# Patient Record
Sex: FEMALE | Race: WHITE | NOT HISPANIC OR LATINO | URBAN - NONMETROPOLITAN AREA
[De-identification: names, ages, dates, MRNs, and addresses within clinical notes are randomized per-mention and may not be internally consistent; named-entity substitution may affect disease eponyms.]

---

## 2020-11-10 ENCOUNTER — OFFICE VISIT (OUTPATIENT)
Dept: URBAN - NONMETROPOLITAN AREA CLINIC 2 | Facility: CLINIC | Age: 75
End: 2020-11-10

## 2021-08-18 ENCOUNTER — LAB OUTSIDE AN ENCOUNTER (OUTPATIENT)
Dept: URBAN - NONMETROPOLITAN AREA CLINIC 2 | Facility: CLINIC | Age: 76
End: 2021-08-18

## 2021-08-18 ENCOUNTER — WEB ENCOUNTER (OUTPATIENT)
Dept: URBAN - NONMETROPOLITAN AREA CLINIC 2 | Facility: CLINIC | Age: 76
End: 2021-08-18

## 2021-08-18 ENCOUNTER — OFFICE VISIT (OUTPATIENT)
Dept: URBAN - NONMETROPOLITAN AREA CLINIC 2 | Facility: CLINIC | Age: 76
End: 2021-08-18
Payer: MEDICARE

## 2021-08-18 VITALS
SYSTOLIC BLOOD PRESSURE: 111 MMHG | TEMPERATURE: 97.5 F | HEART RATE: 83 BPM | HEIGHT: 62 IN | WEIGHT: 102 LBS | BODY MASS INDEX: 18.77 KG/M2 | DIASTOLIC BLOOD PRESSURE: 67 MMHG

## 2021-08-18 DIAGNOSIS — K62.5 RECTAL BLEEDING: ICD-10-CM

## 2021-08-18 DIAGNOSIS — D64.89 ANEMIA DUE TO OTHER CAUSE: ICD-10-CM

## 2021-08-18 DIAGNOSIS — Z12.11 SCREENING FOR COLON CANCER: ICD-10-CM

## 2021-08-18 DIAGNOSIS — K63.89 SMALL INTESTINAL BACTERIAL OVERGROWTH: ICD-10-CM

## 2021-08-18 DIAGNOSIS — R93.5 ABNORMAL MRI OF ABDOMEN: ICD-10-CM

## 2021-08-18 DIAGNOSIS — K58.9 IBS (IRRITABLE BOWEL SYNDROME): ICD-10-CM

## 2021-08-18 DIAGNOSIS — K27.9 PEPTIC ULCER: ICD-10-CM

## 2021-08-18 PROCEDURE — 99214 OFFICE O/P EST MOD 30 MIN: CPT | Performed by: NURSE PRACTITIONER

## 2021-08-18 RX ORDER — FAMOTIDINE 20 MG/1
TAKE 1 TABLET BY MOUTH TWICE A DAY TABLET ORAL TWICE A DAY
Qty: 180 TABLET | Refills: 3 | OUTPATIENT
Start: 2021-08-18

## 2021-08-18 RX ORDER — LIOTHYRONINE SODIUM 5 UG/1
6 DAYS A WEEK TABLET ORAL
Qty: 0 | Refills: 0 | Status: ACTIVE | COMMUNITY
Start: 1900-01-01

## 2021-08-18 RX ORDER — SODIUM PICOSULFATE, MAGNESIUM OXIDE, AND ANHYDROUS CITRIC ACID 10; 3.5; 12 MG/160ML; G/160ML; G/160ML
160 ML LIQUID ORAL
Qty: 320 MILLILITER | Refills: 0 | OUTPATIENT
Start: 2021-08-18 | End: 2021-08-19

## 2021-08-18 NOTE — HPI-TODAY'S VISIT:
12/10/2018 Ms Jarrett Is a 73-year-old female with a past medical history of SIBO he returns for follow-up of GI bleed. She reports she was in [place] in the fall and developed some epigastric discomfort. Previously, she had been treated for with Xifaxan for SIBO and believed it had returned. [place] the [place], she took supplements for IBS that did not resolve her symptoms. She also was prescribed Alinia by physician in [place] that also did not resolve her symptoms. She went to visit her daughter in [place] and continued to have epigastric pain. She began to have black stool and her daughter noticed she had lost roughly 10 pounds. She then presented to emergency department in [place] with a hemoglobin of 6.1. She then had an EGD and colonoscopy while inpatient there. Normal colonoscopy. EGD with a 2.5 cm duodenal ulcer. Pathology negative for H. pylori. No NSAIDs or Goody powder use. She denies any EtOH. She denies any further bleeding, weight loss, or additional GI symptoms. Otherwise, she is healthy.  11/8/19 Ms. Jarrett presents today for f/u of her US and CT and MRI. She was having RUQ pain at our last visit, this has now resolved. She had an US with an abnormal lymph node. Her CT was initially read as normal, but then when compared with US, an inflammed lesion was again identifed. ON MRI, it appears to be a lymph node, but f/u in 6 months was recommeded. Ms. Jarrett is feeling better today and she is without GI complaints. She wants to try to wean down on her PPI. We repeated her EGD because she had a large DU at an outside facility during a GI bleed. Her EGD did not reveal an ulcer. She weaned her PPI down to 20mg in the PM. She started having RUQ abdominal pain and she was concerned about her ulcer. Her recent HGB had improved.  5/8/20 Ms. Connolly presents today for f/u of her MRI. She has been feeling well. She denies any reflux or RUQ pain. She wants to try to wean down on pepcid. Her MRI did show a stable lesion near her adrenal. She is interested in seeing a urologist. Overall, she is without GI complaints and feeling well.  8/18/2021 Barbie presents for evaluation of dyspepsia, reported dark stools with questionable melena, anemia, and Hemoccult positive stool.  She states in mid July she ate a piece of gluten-free bread and developed immediate dyspepsia, gas, bloating and dark stools.  She happened to have an appointment with her family physician that week and had lab work done with mild anemia.  She requested Hemoccult cards, these did come back trace positive.  She did note dark stools for 3 days after this episode.  She did start taking Pepcid with some provement in her dyspepsia.  She denies any NSAID use since her GI bleed in 2018 in [place] with a history of a DU.  She saw Carissa and Ihsan at the time, she did have Crohn's genetics drawn at that time which were positive.  She did have a colonoscopy at the time as well in [place] that was normal per her report.  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  She was referred to Dr. Fry who did perform a work-up, her last MRI in April 2021 shows stable lesion.  She denies any specific GI complaints prior to eating the piece of gluten-free bread.  She denies any weight loss.  Her last EGD was done in 2019 with no peptic ulcer disease noted, her biopsies did show gastritis and esophagitis.  Her last colonoscopy done by our practice was in 2013 by Dr. Herrera with normal terminal ileum and normal random biopsies.  She also had one in [place] in 2018.  These were results are not available in the chart.  Today she is concerned about her anemia and her recent episode of dark stools and dyspepsia.  She would like to proceed with a EGD and colonoscopy for further evaluation.  MB

## 2021-08-19 ENCOUNTER — TELEPHONE ENCOUNTER (OUTPATIENT)
Dept: URBAN - METROPOLITAN AREA CLINIC 92 | Facility: CLINIC | Age: 76
End: 2021-08-19

## 2021-08-19 LAB
A/G RATIO: 1.7
ALBUMIN: 4.5
ALKALINE PHOSPHATASE: 128
ALT (SGPT): 18
AST (SGOT): 26
BASO (ABSOLUTE): 0
BASOS: 1
BILIRUBIN, TOTAL: 0.2
BUN/CREATININE RATIO: 20
BUN: 14
CALCIUM: 9.9
CARBON DIOXIDE, TOTAL: 27
CHLORIDE: 101
CREATININE: 0.7
EGFR IF AFRICN AM: 99
EGFR IF NONAFRICN AM: 86
EOS (ABSOLUTE): 0.1
EOS: 2
FERRITIN, SERUM: 50
GLOBULIN, TOTAL: 2.7
GLUCOSE: 90
HEMATOCRIT: 36.9
HEMATOLOGY COMMENTS:: (no result)
HEMOGLOBIN: 12
IMMATURE CELLS: (no result)
IMMATURE GRANS (ABS): 0
IMMATURE GRANULOCYTES: 0
LYMPHS (ABSOLUTE): 0.8
LYMPHS: 20
MCH: 30.6
MCHC: 32.5
MCV: 94
MONOCYTES(ABSOLUTE): 0.4
MONOCYTES: 10
NEUTROPHILS (ABSOLUTE): 2.6
NEUTROPHILS: 67
NRBC: (no result)
PLATELETS: 338
POTASSIUM: 4.7
PROTEIN, TOTAL: 7.2
RBC: 3.92
RDW: 12.8
SODIUM: 143
WBC: 3.9

## 2021-09-07 ENCOUNTER — TELEPHONE ENCOUNTER (OUTPATIENT)
Dept: URBAN - NONMETROPOLITAN AREA CLINIC 2 | Facility: CLINIC | Age: 76
End: 2021-09-07

## 2021-09-07 RX ORDER — LIOTHYRONINE SODIUM 5 UG/1
6 DAYS A WEEK TABLET ORAL
Qty: 0 | Refills: 0 | Status: ACTIVE | COMMUNITY
Start: 1900-01-01

## 2021-09-07 RX ORDER — SODIUM PICOSULFATE, MAGNESIUM OXIDE, AND ANHYDROUS CITRIC ACID 10; 3.5; 12 MG/160ML; G/160ML; G/160ML
160 ML LIQUID ORAL
Qty: 320 MILLILITER | Refills: 0 | OUTPATIENT
Start: 2021-09-07 | End: 2021-09-08

## 2021-09-07 RX ORDER — FAMOTIDINE 20 MG/1
TAKE 1 TABLET BY MOUTH TWICE A DAY TABLET ORAL TWICE A DAY
Qty: 180 TABLET | Refills: 3 | Status: ACTIVE | COMMUNITY
Start: 2021-08-18

## 2021-09-13 ENCOUNTER — CLAIMS CREATED FROM THE CLAIM WINDOW (OUTPATIENT)
Dept: URBAN - METROPOLITAN AREA CLINIC 4 | Facility: CLINIC | Age: 76
End: 2021-09-13
Payer: MEDICARE

## 2021-09-13 ENCOUNTER — OFFICE VISIT (OUTPATIENT)
Dept: URBAN - NONMETROPOLITAN AREA SURGERY CENTER 1 | Facility: SURGERY CENTER | Age: 76
End: 2021-09-13
Payer: MEDICARE

## 2021-09-13 DIAGNOSIS — K31.89 NONSURGICAL DUMPING SYNDROME: ICD-10-CM

## 2021-09-13 DIAGNOSIS — K21.9 ACID REFLUX: ICD-10-CM

## 2021-09-13 DIAGNOSIS — K50.00 CROHN'S DISEASE OF SMALL INTESTINE WITHOUT COMPLICATIONS: ICD-10-CM

## 2021-09-13 DIAGNOSIS — D50.9 ANEMIA: ICD-10-CM

## 2021-09-13 DIAGNOSIS — K21.9 GASTRO-ESOPHAGEAL REFLUX DISEASE WITHOUT ESOPHAGITIS: ICD-10-CM

## 2021-09-13 DIAGNOSIS — K50.80 CROHN'S COLITIS: ICD-10-CM

## 2021-09-13 PROCEDURE — G8907 PT DOC NO EVENTS ON DISCHARG: HCPCS | Performed by: INTERNAL MEDICINE

## 2021-09-13 PROCEDURE — 88305 TISSUE EXAM BY PATHOLOGIST: CPT | Performed by: PATHOLOGY

## 2021-09-13 PROCEDURE — 45380 COLONOSCOPY AND BIOPSY: CPT | Performed by: INTERNAL MEDICINE

## 2021-09-13 PROCEDURE — 88312 SPECIAL STAINS GROUP 1: CPT | Performed by: PATHOLOGY

## 2021-09-13 PROCEDURE — 43239 EGD BIOPSY SINGLE/MULTIPLE: CPT | Performed by: INTERNAL MEDICINE

## 2021-09-28 ENCOUNTER — WEB ENCOUNTER (OUTPATIENT)
Dept: URBAN - NONMETROPOLITAN AREA CLINIC 2 | Facility: CLINIC | Age: 76
End: 2021-09-28

## 2021-10-04 ENCOUNTER — WEB ENCOUNTER (OUTPATIENT)
Dept: URBAN - NONMETROPOLITAN AREA CLINIC 2 | Facility: CLINIC | Age: 76
End: 2021-10-04

## 2021-10-08 ENCOUNTER — WEB ENCOUNTER (OUTPATIENT)
Dept: URBAN - NONMETROPOLITAN AREA CLINIC 2 | Facility: CLINIC | Age: 76
End: 2021-10-08

## 2021-10-10 ENCOUNTER — WEB ENCOUNTER (OUTPATIENT)
Dept: URBAN - NONMETROPOLITAN AREA CLINIC 2 | Facility: CLINIC | Age: 76
End: 2021-10-10

## 2021-10-11 ENCOUNTER — WEB ENCOUNTER (OUTPATIENT)
Dept: URBAN - NONMETROPOLITAN AREA CLINIC 2 | Facility: CLINIC | Age: 76
End: 2021-10-11

## 2021-10-13 ENCOUNTER — LAB OUTSIDE AN ENCOUNTER (OUTPATIENT)
Dept: URBAN - NONMETROPOLITAN AREA CLINIC 2 | Facility: CLINIC | Age: 76
End: 2021-10-13

## 2021-10-13 ENCOUNTER — OFFICE VISIT (OUTPATIENT)
Dept: URBAN - NONMETROPOLITAN AREA CLINIC 2 | Facility: CLINIC | Age: 76
End: 2021-10-13
Payer: MEDICARE

## 2021-10-13 ENCOUNTER — WEB ENCOUNTER (OUTPATIENT)
Dept: URBAN - NONMETROPOLITAN AREA CLINIC 2 | Facility: CLINIC | Age: 76
End: 2021-10-13

## 2021-10-13 VITALS
HEIGHT: 62 IN | SYSTOLIC BLOOD PRESSURE: 116 MMHG | TEMPERATURE: 97.3 F | DIASTOLIC BLOOD PRESSURE: 74 MMHG | HEART RATE: 84 BPM | BODY MASS INDEX: 18.99 KG/M2 | WEIGHT: 103.2 LBS

## 2021-10-13 DIAGNOSIS — T18.9XXA FOREIGN BODY IN DIGESTIVE TRACT, INITIAL ENCOUNTER: ICD-10-CM

## 2021-10-13 DIAGNOSIS — K27.9 PEPTIC ULCER: ICD-10-CM

## 2021-10-13 DIAGNOSIS — Z12.11 SCREENING FOR COLON CANCER: ICD-10-CM

## 2021-10-13 DIAGNOSIS — K62.5 RECTAL BLEEDING: ICD-10-CM

## 2021-10-13 DIAGNOSIS — R93.5 ABNORMAL MRI OF ABDOMEN: ICD-10-CM

## 2021-10-13 DIAGNOSIS — K52.9 ILEITIS: ICD-10-CM

## 2021-10-13 DIAGNOSIS — D64.9 ANEMIA: ICD-10-CM

## 2021-10-13 DIAGNOSIS — K63.89 SMALL INTESTINAL BACTERIAL OVERGROWTH: ICD-10-CM

## 2021-10-13 DIAGNOSIS — K58.9 IBS (IRRITABLE BOWEL SYNDROME): ICD-10-CM

## 2021-10-13 PROCEDURE — 99214 OFFICE O/P EST MOD 30 MIN: CPT | Performed by: NURSE PRACTITIONER

## 2021-10-13 RX ORDER — LIOTHYRONINE SODIUM 5 UG/1
6 DAYS A WEEK TABLET ORAL
Qty: 0 | Refills: 0 | COMMUNITY
Start: 1900-01-01

## 2021-10-13 RX ORDER — FAMOTIDINE 20 MG/1
TAKE 1 TABLET BY MOUTH TWICE A DAY TABLET ORAL TWICE A DAY
Qty: 180 TABLET | Refills: 3 | COMMUNITY
Start: 2021-08-18

## 2021-10-13 NOTE — HPI-TODAY'S VISIT:
12/10/2018 Ms Jarrett Is a 73-year-old female with a past medical history of SIBO he returns for follow-up of GI bleed. She reports she was in [place] in the fall and developed some epigastric discomfort. Previously, she had been treated for with Xifaxan for SIBO and believed it had returned. [place], she [place] supplements for IBS that did not resolve her symptoms. She also was prescribed Alinia by physician in [place] that also did not resolve her symptoms. She went to visit her daughter in [place] and continued to have epigastric pain. She began to have black stool and her daughter noticed she had lost roughly 10 pounds. She then presented to emergency department in [place] with a hemoglobin of 6.1. She then had an EGD and colonoscopy while inpatient there. Normal colonoscopy. EGD with a 2.5 cm duodenal ulcer. Pathology negative for H. pylori. No NSAIDs or Goody powder use. She denies any EtOH. She denies any further bleeding, weight loss, or additional GI symptoms. Otherwise, she is healthy.  11/8/19 Ms. Jarrett presents today for f/u of her US and CT and MRI. She was having RUQ pain at our last visit, this has now resolved. She had an US with an abnormal lymph node. Her CT was initially read as normal, but then when compared with US, an inflammed lesion was again identifed. ON MRI, it appears to be a lymph node, but f/u in 6 months was recommeded. Ms. Jarrett is feeling better today and she is without GI complaints. She wants to try to wean down on her PPI. We repeated her EGD because she had a large DU at an outside facility during a GI bleed. Her EGD did not reveal an ulcer. She weaned her PPI down to 20mg in the PM. She started having RUQ abdominal pain and she was concerned about her ulcer. Her recent HGB had improved.  5/8/20 Ms. Connolly presents today for f/u of her MRI. She has been feeling well. She denies any reflux or RUQ pain. She wants to try to wean down on pepcid. Her MRI did show a stable lesion near her adrenal. She is interested in seeing a urologist. Overall, she is without GI complaints and feeling well.  8/18/2021 Barbie presents for evaluation of dyspepsia, reported dark stools with questionable melena, anemia, and Hemoccult positive stool.  She states in mid July she ate a piece of gluten-free bread and developed immediate dyspepsia, gas, bloating and dark stools.  She happened to have an appointment with her family physician that week and had lab work done with mild anemia.  She requested Hemoccult cards, these did come back trace positive.  She did note dark stools for 3 days after this episode.  She did start taking Pepcid with some provement in her dyspepsia.  She denies any NSAID use since her GI bleed in 2018 in [place] with a history of a DU.  She saw Carissa and Ihsan at the time, she did have Crohn's genetics drawn at that time which were positive.  She did have a colonoscopy at the time as well in [place] that was normal per her report.  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  She was referred to Dr. Fry who did perform a work-up, her last MRI in April 2021 shows stable lesion.  She denies any specific GI complaints prior to eating the piece of gluten-free bread.  She denies any weight loss.  Her last EGD was done in 2019 with no peptic ulcer disease noted, her biopsies did show gastritis and esophagitis.  Her last colonoscopy done by our practice was in 2013 by Dr. Herrera with normal terminal ileum and normal random biopsies.  She also had one in [place] in 2018.  These were results are not available in the chart.  Today she is concerned about her anemia and her recent episode of dark stools and dyspepsia.  She would like to proceed with a EGD and colonoscopy for further evaluation.  MB   10/13/2021 Barbie presents for endoscopy and colonoscopy follow-up.  Since her last visit her lab work reveals no anemia and normal ferritin.  Her EGD reveals normal small bowel, normal stomach, with mild gastritis, and normal esophagus other than mild esophagitis, her colonoscopy reveals multiple aphthous ulcers and friability with erythema in her terminal ileum and at the ileocecal valve.  Her biopsy showed normal small bowel biopsies, normal gastric biopsies, mild reflux esophagitis, she does have terminal ileitis with features concerning for Crohn's disease although she does not have any granulomas.  She does have a history of positive Crohn's genetics as listed above.  She continues to struggle with constipation but has a BM daily on Magnesium and probiotics. She denies any micky bleeding but her stools are darker per her report. Today we had a long discussion regarding the differential.  She has not taken NSAIDs in years, she has had multiple scans of her abdomen with no history of documented mesenteric vascular stenosis, she does have chronic constipation.  She understands that the differential includes ischemia, history of NSAID use, and Crohn's disease.  She agrees to try budesonide and to proceed with video capsule endoscopy for further evaluation.  MB

## 2021-10-14 ENCOUNTER — TELEPHONE ENCOUNTER (OUTPATIENT)
Dept: URBAN - NONMETROPOLITAN AREA CLINIC 13 | Facility: CLINIC | Age: 76
End: 2021-10-14

## 2021-10-14 ENCOUNTER — WEB ENCOUNTER (OUTPATIENT)
Dept: URBAN - NONMETROPOLITAN AREA CLINIC 2 | Facility: CLINIC | Age: 76
End: 2021-10-14

## 2021-10-14 RX ORDER — FAMOTIDINE 20 MG/1
TAKE 1 TABLET BY MOUTH TWICE A DAY TABLET ORAL TWICE A DAY
Qty: 180 TABLET | Refills: 3 | COMMUNITY
Start: 2021-08-18

## 2021-10-14 RX ORDER — BUDESONIDE 3 MG/1
3 CAP PO DAILY CAPSULE, COATED PELLETS ORAL ONCE A DAY
Qty: 90 CAPSULE | Refills: 0 | OUTPATIENT
Start: 2021-10-14

## 2021-10-14 RX ORDER — LIOTHYRONINE SODIUM 5 UG/1
6 DAYS A WEEK TABLET ORAL
Qty: 0 | Refills: 0 | COMMUNITY
Start: 1900-01-01

## 2021-10-15 ENCOUNTER — WEB ENCOUNTER (OUTPATIENT)
Dept: URBAN - NONMETROPOLITAN AREA CLINIC 2 | Facility: CLINIC | Age: 76
End: 2021-10-15

## 2021-11-01 ENCOUNTER — WEB ENCOUNTER (OUTPATIENT)
Dept: URBAN - NONMETROPOLITAN AREA CLINIC 2 | Facility: CLINIC | Age: 76
End: 2021-11-01

## 2021-11-02 ENCOUNTER — WEB ENCOUNTER (OUTPATIENT)
Dept: URBAN - NONMETROPOLITAN AREA CLINIC 2 | Facility: CLINIC | Age: 76
End: 2021-11-02

## 2021-11-04 ENCOUNTER — OFFICE VISIT (OUTPATIENT)
Dept: URBAN - NONMETROPOLITAN AREA CLINIC 1 | Facility: CLINIC | Age: 76
End: 2021-11-04

## 2021-11-04 ENCOUNTER — OFFICE VISIT (OUTPATIENT)
Dept: URBAN - NONMETROPOLITAN AREA CLINIC 2 | Facility: CLINIC | Age: 76
End: 2021-11-04

## 2021-11-08 ENCOUNTER — WEB ENCOUNTER (OUTPATIENT)
Dept: URBAN - NONMETROPOLITAN AREA CLINIC 2 | Facility: CLINIC | Age: 76
End: 2021-11-08

## 2021-11-25 ENCOUNTER — WEB ENCOUNTER (OUTPATIENT)
Dept: URBAN - NONMETROPOLITAN AREA CLINIC 2 | Facility: CLINIC | Age: 76
End: 2021-11-25

## 2021-11-25 RX ORDER — BUDESONIDE 3 MG/1
3 CAP PO DAILY CAPSULE, COATED PELLETS ORAL ONCE A DAY
Qty: 90 CAPSULE | Refills: 0
Start: 2021-10-14

## 2021-12-06 ENCOUNTER — WEB ENCOUNTER (OUTPATIENT)
Dept: URBAN - NONMETROPOLITAN AREA CLINIC 2 | Facility: CLINIC | Age: 76
End: 2021-12-06

## 2021-12-09 ENCOUNTER — OFFICE VISIT (OUTPATIENT)
Dept: URBAN - NONMETROPOLITAN AREA CLINIC 2 | Facility: CLINIC | Age: 76
End: 2021-12-09
Payer: MEDICARE

## 2021-12-09 DIAGNOSIS — K55.1 MESENTERIC ISCHEMIA, CHRONIC: ICD-10-CM

## 2021-12-09 PROCEDURE — 91299 UNLISTED DX GI PROCEDURE: CPT | Performed by: INTERNAL MEDICINE

## 2021-12-09 RX ORDER — FAMOTIDINE 20 MG/1
TAKE 1 TABLET BY MOUTH TWICE A DAY TABLET ORAL TWICE A DAY
Qty: 180 TABLET | Refills: 3 | COMMUNITY
Start: 2021-08-18

## 2021-12-09 RX ORDER — LIOTHYRONINE SODIUM 5 UG/1
6 DAYS A WEEK TABLET ORAL
Qty: 0 | Refills: 0 | COMMUNITY
Start: 1900-01-01

## 2021-12-09 RX ORDER — BUDESONIDE 3 MG/1
3 CAP PO DAILY CAPSULE, COATED PELLETS ORAL ONCE A DAY
Qty: 90 CAPSULE | Refills: 0 | Status: ACTIVE | COMMUNITY
Start: 2021-10-14

## 2021-12-15 ENCOUNTER — WEB ENCOUNTER (OUTPATIENT)
Dept: URBAN - NONMETROPOLITAN AREA CLINIC 2 | Facility: CLINIC | Age: 76
End: 2021-12-15

## 2021-12-20 ENCOUNTER — OFFICE VISIT (OUTPATIENT)
Dept: URBAN - NONMETROPOLITAN AREA CLINIC 1 | Facility: CLINIC | Age: 76
End: 2021-12-20
Payer: MEDICARE

## 2021-12-20 DIAGNOSIS — K52.9 ILEITIS: ICD-10-CM

## 2021-12-20 PROCEDURE — 91110 GI TRC IMG INTRAL ESOPH-ILE: CPT | Performed by: INTERNAL MEDICINE

## 2021-12-20 RX ORDER — BUDESONIDE 3 MG/1
3 CAP PO DAILY CAPSULE, COATED PELLETS ORAL ONCE A DAY
Qty: 90 CAPSULE | Refills: 0 | Status: ACTIVE | COMMUNITY
Start: 2021-10-14

## 2021-12-20 RX ORDER — FAMOTIDINE 20 MG/1
TAKE 1 TABLET BY MOUTH TWICE A DAY TABLET ORAL TWICE A DAY
Qty: 180 TABLET | Refills: 3 | COMMUNITY
Start: 2021-08-18

## 2021-12-20 RX ORDER — LIOTHYRONINE SODIUM 5 UG/1
6 DAYS A WEEK TABLET ORAL
Qty: 0 | Refills: 0 | COMMUNITY
Start: 1900-01-01

## 2021-12-23 ENCOUNTER — WEB ENCOUNTER (OUTPATIENT)
Dept: URBAN - NONMETROPOLITAN AREA CLINIC 2 | Facility: CLINIC | Age: 76
End: 2021-12-23

## 2022-01-03 ENCOUNTER — TELEPHONE ENCOUNTER (OUTPATIENT)
Dept: URBAN - METROPOLITAN AREA CLINIC 92 | Facility: CLINIC | Age: 77
End: 2022-01-03

## 2022-01-19 ENCOUNTER — WEB ENCOUNTER (OUTPATIENT)
Dept: URBAN - NONMETROPOLITAN AREA CLINIC 2 | Facility: CLINIC | Age: 77
End: 2022-01-19

## 2022-01-21 ENCOUNTER — WEB ENCOUNTER (OUTPATIENT)
Dept: URBAN - NONMETROPOLITAN AREA CLINIC 2 | Facility: CLINIC | Age: 77
End: 2022-01-21

## 2022-03-15 ENCOUNTER — OFFICE VISIT (OUTPATIENT)
Dept: URBAN - NONMETROPOLITAN AREA CLINIC 2 | Facility: CLINIC | Age: 77
End: 2022-03-15
Payer: MEDICARE

## 2022-03-15 ENCOUNTER — TELEPHONE ENCOUNTER (OUTPATIENT)
Dept: URBAN - METROPOLITAN AREA CLINIC 92 | Facility: CLINIC | Age: 77
End: 2022-03-15

## 2022-03-15 DIAGNOSIS — K50.00 CROHN'S DISEASE OF SMALL INTESTINE WITHOUT COMPLICATION: ICD-10-CM

## 2022-03-15 DIAGNOSIS — Z12.11 SCREENING FOR COLON CANCER: ICD-10-CM

## 2022-03-15 DIAGNOSIS — K58.9 IBS (IRRITABLE BOWEL SYNDROME): ICD-10-CM

## 2022-03-15 DIAGNOSIS — D64.9 ANEMIA: ICD-10-CM

## 2022-03-15 DIAGNOSIS — K27.9 PEPTIC ULCER: ICD-10-CM

## 2022-03-15 DIAGNOSIS — K62.5 RECTAL BLEEDING: ICD-10-CM

## 2022-03-15 DIAGNOSIS — R93.5 ABNORMAL MRI OF ABDOMEN: ICD-10-CM

## 2022-03-15 DIAGNOSIS — K63.89 SMALL INTESTINAL BACTERIAL OVERGROWTH: ICD-10-CM

## 2022-03-15 DIAGNOSIS — K52.9 ILEITIS: ICD-10-CM

## 2022-03-15 PROCEDURE — 99214 OFFICE O/P EST MOD 30 MIN: CPT | Performed by: INTERNAL MEDICINE

## 2022-03-15 RX ORDER — USTEKINUMAB 130 MG/26ML
AS DIRECTED SOLUTION INTRAVENOUS ONCE
Qty: 260 MILLIGRAMS | Refills: 0 | OUTPATIENT
Start: 2022-03-15 | End: 2022-03-16

## 2022-03-15 RX ORDER — USTEKINUMAB 90 MG/ML
SUBCUTANEOUS EVERY 8 WEEKS INJECTION, SOLUTION SUBCUTANEOUS
Qty: 90 MILLIGRAMS | Refills: 6 | OUTPATIENT
Start: 2022-03-15 | End: 2023-05-09

## 2022-03-15 RX ORDER — LIOTHYRONINE SODIUM 5 UG/1
6 DAYS A WEEK TABLET ORAL
Qty: 0 | Refills: 0 | Status: ACTIVE | COMMUNITY
Start: 1900-01-01

## 2022-03-15 RX ORDER — BUDESONIDE 3 MG/1
3 CAP PO DAILY CAPSULE, COATED PELLETS ORAL ONCE A DAY
Qty: 90 CAPSULE | Refills: 0 | Status: ACTIVE | COMMUNITY
Start: 2021-10-14

## 2022-03-15 RX ORDER — FAMOTIDINE 20 MG/1
TAKE 1 TABLET BY MOUTH TWICE A DAY TABLET ORAL TWICE A DAY
Qty: 180 TABLET | Refills: 3 | Status: ACTIVE | COMMUNITY
Start: 2021-08-18

## 2022-03-15 NOTE — HPI-TODAY'S VISIT:
12/10/2018 Ms Jarrett Is a 73-year-old female with a past medical history of SIBO he returns for follow-up of GI bleed. She reports she was in [place] in the fall and developed some epigastric discomfort. Previously, she had been treated for with Xifaxan for SIBO and believed it had returned. [place] supplements for IBS that did not resolve her symptoms. She also was prescribed Alinia by physician in [place] that also did not resolve her symptoms. She went to visit her daughter in [place] and continued to have epigastric pain. She began to have black stool and her daughter noticed she had lost roughly 10 pounds. She then presented to emergency department in [place] with a hemoglobin of 6.1. She then had an EGD and colonoscopy while inpatient there. Normal colonoscopy. EGD with a 2.5 cm duodenal ulcer. Pathology negative for H. pylori. No NSAIDs or Goody powder use. She denies any EtOH. She denies any further bleeding, weight loss, or additional GI symptoms. Otherwise, she is healthy.  11/8/19 Ms. Jarrett presents today for f/u of her US and CT and MRI. She was having RUQ pain at our last visit, this has now resolved. She had an US with an abnormal lymph node. Her CT was initially read as normal, but then when compared with US, an inflammed lesion was again identifed. ON MRI, it appears to be a lymph node, but f/u in 6 months was recommeded. Ms. Jarrett is feeling better today and she is without GI complaints. She wants to try to wean down on her PPI. We repeated her EGD because she had a large DU at an outside facility during a GI bleed. Her EGD did not reveal an ulcer. She weaned her PPI down to 20mg in the PM. She started having RUQ abdominal pain and she was concerned about her ulcer. Her recent HGB had improved.  5/8/20 Ms. Connolly presents today for f/u of her MRI. She has been feeling well. She denies any reflux or RUQ pain. She wants to try to wean down on pepcid. Her MRI did show a stable lesion near her adrenal. She is interested in seeing a urologist. Overall, she is without GI complaints and feeling well.  8/18/2021 Barbie presents for evaluation of dyspepsia, reported dark stools with questionable melena, anemia, and Hemoccult positive stool.  She states in mid July she ate a piece of gluten-free bread and developed immediate dyspepsia, gas, bloating and dark stools.  She happened to have an appointment with her family physician that week and had lab work done with mild anemia.  She requested Hemoccult cards, these did come back trace positive.  She did note dark stools for 3 days after this episode.  She did start taking Pepcid with some provement in her dyspepsia.  She denies any NSAID use since her GI bleed in 2018 in [place] with a history of a DU.  She saw Carissa and Ihsan at the time, she did have Crohn's genetics drawn at that time which were positive.  She did have a colonoscopy at the time as well in [place] that was normal per her report.  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  She was referred to Dr. Fry who did perform a work-up, her last MRI in April 2021 shows stable lesion.  She denies any specific GI complaints prior to eating the piece of gluten-free bread.  She denies any weight loss.  Her last EGD was done in 2019 with no peptic ulcer disease noted, her biopsies did show gastritis and esophagitis.  Her last colonoscopy done by our practice was in 2013 by Dr. Herrera with normal terminal ileum and normal random biopsies.  She also had one in [place] in 2018.  These were results are not available in the chart.  Today she is concerned about her anemia and her recent episode of dark stools and dyspepsia.  She would like to proceed with a EGD and colonoscopy for further evaluation.  MB   10/13/2021 Barbie presents for endoscopy and colonoscopy follow-up.  Since her last visit her lab work reveals no anemia and normal ferritin.  Her EGD reveals normal small bowel, normal stomach, with mild gastritis, and normal esophagus other than mild esophagitis, her colonoscopy reveals multiple aphthous ulcers and friability with erythema in her terminal ileum and at the ileocecal valve.  Her biopsy showed normal small bowel biopsies, normal gastric biopsies, mild reflux esophagitis, she does have terminal ileitis with features concerning for Crohn's disease although she does not have any granulomas.  She does have a history of positive Crohn's genetics as listed above.  She continues to struggle with constipation but has a BM daily on Magnesium and probiotics. She denies any micky bleeding but her stools are darker per her report. Today we had a long discussion regarding the differential.  She has not taken NSAIDs in years, she has had multiple scans of her abdomen with no history of documented mesenteric vascular stenosis, she does have chronic constipation.  She understands that the differential includes ischemia, history of NSAID use, and Crohn's disease.  She agrees to try budesonide and to proceed with video capsule endoscopy for further evaluation.  MB 3/15/2022 The patient presents today for follow-up of her capsule endoscopy and Crohn's disease.  Since her last visit, she has had a complicated course with multiple bouts of C. difficile.  She has been treated with vancomycin multiple times, and her most recent C. difficile result was negative.  She is actually feeling better now than she has in quite some time.  She is not having any further abdominal pain, diarrhea, or rectal bleeding.  She never had any diarrhea with her C. difficile.  This was found incidentally.  Since her last visit, she also had a capsule endoscopy.  Her capsule endoscopy was consistent with Crohn's disease with multiple aphthous ulcers throughout her small bowel.  We have had a long discussion today about Crohn's disease and the implications.  I have recommended that we start her on medications for her Crohn's, and we have discussed Stelara.  She does want to think about this, but she is in agreement that she does need to have treatment.  She continues to have issues with constipation.  She also has a very limited diet.  There are many foods that aggravate her gut.  Overall, today, her GI symptoms are actually well controlled and stable.

## 2022-04-26 ENCOUNTER — OFFICE VISIT (OUTPATIENT)
Dept: URBAN - NONMETROPOLITAN AREA CLINIC 1 | Facility: CLINIC | Age: 77
End: 2022-04-26

## 2022-07-05 ENCOUNTER — OFFICE VISIT (OUTPATIENT)
Dept: URBAN - NONMETROPOLITAN AREA CLINIC 2 | Facility: CLINIC | Age: 77
End: 2022-07-05

## 2022-11-01 ENCOUNTER — OFFICE VISIT (OUTPATIENT)
Dept: URBAN - NONMETROPOLITAN AREA CLINIC 2 | Facility: CLINIC | Age: 77
End: 2022-11-01

## 2023-03-21 ENCOUNTER — OFFICE VISIT (OUTPATIENT)
Dept: URBAN - NONMETROPOLITAN AREA CLINIC 2 | Facility: CLINIC | Age: 78
End: 2023-03-21
Payer: MEDICARE

## 2023-03-21 ENCOUNTER — LAB OUTSIDE AN ENCOUNTER (OUTPATIENT)
Dept: URBAN - NONMETROPOLITAN AREA CLINIC 2 | Facility: CLINIC | Age: 78
End: 2023-03-21

## 2023-03-21 VITALS
HEART RATE: 73 BPM | TEMPERATURE: 98.8 F | DIASTOLIC BLOOD PRESSURE: 72 MMHG | WEIGHT: 110 LBS | HEIGHT: 62 IN | BODY MASS INDEX: 20.24 KG/M2 | SYSTOLIC BLOOD PRESSURE: 119 MMHG

## 2023-03-21 DIAGNOSIS — Z12.11 SCREENING FOR COLON CANCER: ICD-10-CM

## 2023-03-21 DIAGNOSIS — A04.72 C. DIFFICILE DIARRHEA: ICD-10-CM

## 2023-03-21 DIAGNOSIS — K50.00 CROHN'S DISEASE OF SMALL INTESTINE WITHOUT COMPLICATION: ICD-10-CM

## 2023-03-21 DIAGNOSIS — R93.5 ABNORMAL MRI OF ABDOMEN: ICD-10-CM

## 2023-03-21 DIAGNOSIS — K58.9 IBS (IRRITABLE BOWEL SYNDROME): ICD-10-CM

## 2023-03-21 DIAGNOSIS — D64.9 ANEMIA: ICD-10-CM

## 2023-03-21 DIAGNOSIS — K52.9 ILEITIS: ICD-10-CM

## 2023-03-21 DIAGNOSIS — K62.5 RECTAL BLEEDING: ICD-10-CM

## 2023-03-21 DIAGNOSIS — K27.9 PEPTIC ULCER: ICD-10-CM

## 2023-03-21 PROCEDURE — 99214 OFFICE O/P EST MOD 30 MIN: CPT | Performed by: NURSE PRACTITIONER

## 2023-03-21 RX ORDER — LIOTHYRONINE SODIUM 5 UG/1
6 DAYS A WEEK TABLET ORAL
Qty: 0 | Refills: 0 | Status: ACTIVE | COMMUNITY
Start: 1900-01-01

## 2023-03-21 RX ORDER — FAMOTIDINE 20 MG/1
TAKE 1 TABLET BY MOUTH TWICE A DAY TABLET ORAL TWICE A DAY
Qty: 180 TABLET | Refills: 3 | Status: ON HOLD | COMMUNITY
Start: 2021-08-18

## 2023-03-21 RX ORDER — BUDESONIDE 3 MG/1
3 CAP PO DAILY CAPSULE, COATED PELLETS ORAL ONCE A DAY
Qty: 90 CAPSULE | Refills: 0 | Status: ON HOLD | COMMUNITY
Start: 2021-10-14

## 2023-03-21 RX ORDER — USTEKINUMAB 90 MG/ML
SUBCUTANEOUS EVERY 8 WEEKS INJECTION, SOLUTION SUBCUTANEOUS
Qty: 90 MILLIGRAMS | Refills: 6 | Status: ON HOLD | COMMUNITY
Start: 2022-03-15 | End: 2023-05-09

## 2023-03-21 RX ORDER — SODIUM PICOSULFATE, MAGNESIUM OXIDE, AND ANHYDROUS CITRIC ACID 10; 3.5; 12 MG/160ML; G/160ML; G/160ML
160ML X 2 AS DIRECTED LIQUID ORAL ONCE
Qty: 320 MILLILITER | Refills: 0 | OUTPATIENT
Start: 2023-03-21

## 2023-03-21 NOTE — HPI-TODAY'S VISIT:
12/10/2018 Ms Jarrett Is a 73-year-old female with a past medical history of SIBO he returns for follow-up of GI bleed. She reports she was in [place] in the fall and developed some epigastric discomfort. Previously, she had been treated for with Xifaxan for SIBO and believed it had returned. [place], [place] supplements for IBS that did not resolve her symptoms. She also was prescribed Alinia by physician in [place] that also did not resolve her symptoms. She went to visit her daughter in [place] and continued to have epigastric pain. She began to have black stool and her daughter noticed she had lost roughly 10 pounds. She then presented to emergency department in [place] with a hemoglobin of 6.1. She then had an EGD and colonoscopy while inpatient there. Normal colonoscopy. EGD with a 2.5 cm duodenal ulcer. Pathology negative for H. pylori. No NSAIDs or Goody powder use. She denies any EtOH. She denies any further bleeding, weight loss, or additional GI symptoms. Otherwise, she is healthy.  11/8/19 Ms. Jarrett presents today for f/u of her US and CT and MRI. She was having RUQ pain at our last visit, this has now resolved. She had an US with an abnormal lymph node. Her CT was initially read as normal, but then when compared with US, an inflammed lesion was again identifed. ON MRI, it appears to be a lymph node, but f/u in 6 months was recommeded. Ms. Jarrett is feeling better today and she is without GI complaints. She wants to try to wean down on her PPI. We repeated her EGD because she had a large DU at an outside facility during a GI bleed. Her EGD did not reveal an ulcer. She weaned her PPI down to 20mg in the PM. She started having RUQ abdominal pain and she was concerned about her ulcer. Her recent HGB had improved.  5/8/20 Ms. Connolly presents today for f/u of her MRI. She has been feeling well. She denies any reflux or RUQ pain. She wants to try to wean down on pepcid. Her MRI did show a stable lesion near her adrenal. She is interested in seeing a urologist. Overall, she is without GI complaints and feeling well.  8/18/2021 Barbie presents for evaluation of dyspepsia, reported dark stools with questionable melena, anemia, and Hemoccult positive stool.  She states in mid July she ate a piece of gluten-free bread and developed immediate dyspepsia, gas, bloating and dark stools.  She happened to have an appointment with her family physician that week and had lab work done with mild anemia.  She requested Hemoccult cards, these did come back trace positive.  She did note dark stools for 3 days after this episode.  She did start taking Pepcid with some provement in her dyspepsia.  She denies any NSAID use since her GI bleed in 2018 in [place] with a history of a DU.  She saw Carissa and Ihsan at the time, she did have Crohn's genetics drawn at that time which were positive.  She did have a colonoscopy at the time as well in [place] that was normal per her report.  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  She was referred to Dr. Fry who did perform a work-up, her last MRI in April 2021 shows stable lesion.  She denies any specific GI complaints prior to eating the piece of gluten-free bread.  She denies any weight loss.  Her last EGD was done in 2019 with no peptic ulcer disease noted, her biopsies did show gastritis and esophagitis.  Her last colonoscopy done by our practice was in 2013 by Dr. Herrera with normal terminal ileum and normal random biopsies.  She also had one in [place] in 2018.  These were results are not available in the chart.  Today she is concerned about her anemia and her recent episode of dark stools and dyspepsia.  She would like to proceed with a EGD and colonoscopy for further evaluation.  MB   10/13/2021 Barbie presents for endoscopy and colonoscopy follow-up.  Since her last visit her lab work reveals no anemia and normal ferritin.  Her EGD reveals normal small bowel, normal stomach, with mild gastritis, and normal esophagus other than mild esophagitis, her colonoscopy reveals multiple aphthous ulcers and friability with erythema in her terminal ileum and at the ileocecal valve.  Her biopsy showed normal small bowel biopsies, normal gastric biopsies, mild reflux esophagitis, she does have terminal ileitis with features concerning for Crohn's disease although she does not have any granulomas.  She does have a history of positive Crohn's genetics as listed above.  She continues to struggle with constipation but has a BM daily on Magnesium and probiotics. She denies any micky bleeding but her stools are darker per her report. Today we had a long discussion regarding the differential.  She has not taken NSAIDs in years, she has had multiple scans of her abdomen with no history of documented mesenteric vascular stenosis, she does have chronic constipation.  She understands that the differential includes ischemia, history of NSAID use, and Crohn's disease.  She agrees to try budesonide and to proceed with video capsule endoscopy for further evaluation.  MB 3/15/2022 The patient presents today for follow-up of her capsule endoscopy and Crohn's disease.  Since her last visit, she has had a complicated course with multiple bouts of C. difficile.  She has been treated with vancomycin multiple times, and her most recent C. difficile result was negative.  She is actually feeling better now than she has in quite some time.  She is not having any further abdominal pain, diarrhea, or rectal bleeding.  She never had any diarrhea with her C. difficile.  This was found incidentally.  Since her last visit, she also had a capsule endoscopy.  Her capsule endoscopy was consistent with Crohn's disease with multiple aphthous ulcers throughout her small bowel.  We have had a long discussion today about Crohn's disease and the implications.  I have recommended that we start her on medications for her Crohn's, and we have discussed Stelara.  She does want to think about this, but she is in agreement that she does need to have treatment.  She continues to have issues with constipation.  She also has a very limited diet.  There are many foods that aggravate her gut.  Overall, today, her GI symptoms are actually well controlled and stable. 3/21/2023 Barbie presents for follow-up of Crohn's disease and history of C. difficile.  Since her last visit she passed a clot of stool in late January.  She called her primary care physician who is also naturopath who ordered a stool PCR panel through [employer].  The PCR panel showed C. difficile toxin positivity, no PCR was actually drawn.  She had no diarrhea at the time and only had this 1 episode of blood in the stool.  They decided to wait to treat and to repeat her stool studies in 2 weeks.  He repeated the PCR panel along with an additional C. difficile toxin via EIA, and PCR.  The GI stool panel showed positive toxin however the toxin by Kristen is negative along with the PCR being positive.  She has had no diarrhea throughout.  He recommended a vancomycin taper given her history of vancomycin x2 last year.  She has a history of Crohn's disease which she continues to decline remission therapy for.  She has 1-2 soft bowel movements daily, recently they have been somewhat more dark.  She has not had any recent lab work.  Today we have discussed her work-up.  I do not suspect that she has C. difficile, I suspect that the GI stool panel is a false positive given no diarrhea.  She does have a negative fecal calprotectin as well.  We will schedule colonoscopy for further evaluation of her untreated Crohn's disease and rectal bleeding.  MB

## 2023-03-22 ENCOUNTER — WEB ENCOUNTER (OUTPATIENT)
Dept: URBAN - NONMETROPOLITAN AREA CLINIC 2 | Facility: CLINIC | Age: 78
End: 2023-03-22

## 2023-03-23 ENCOUNTER — WEB ENCOUNTER (OUTPATIENT)
Dept: URBAN - NONMETROPOLITAN AREA CLINIC 2 | Facility: CLINIC | Age: 78
End: 2023-03-23

## 2023-03-23 ENCOUNTER — LAB OUTSIDE AN ENCOUNTER (OUTPATIENT)
Dept: URBAN - NONMETROPOLITAN AREA CLINIC 2 | Facility: CLINIC | Age: 78
End: 2023-03-23

## 2023-04-04 ENCOUNTER — OFFICE VISIT (OUTPATIENT)
Dept: URBAN - NONMETROPOLITAN AREA SURGERY CENTER 1 | Facility: SURGERY CENTER | Age: 78
End: 2023-04-04

## 2023-05-03 ENCOUNTER — TELEPHONE ENCOUNTER (OUTPATIENT)
Dept: URBAN - NONMETROPOLITAN AREA CLINIC 2 | Facility: CLINIC | Age: 78
End: 2023-05-03

## 2023-05-09 ENCOUNTER — TELEPHONE ENCOUNTER (OUTPATIENT)
Dept: URBAN - NONMETROPOLITAN AREA CLINIC 2 | Facility: CLINIC | Age: 78
End: 2023-05-09

## 2023-05-11 ENCOUNTER — LAB OUTSIDE AN ENCOUNTER (OUTPATIENT)
Dept: URBAN - NONMETROPOLITAN AREA CLINIC 2 | Facility: CLINIC | Age: 78
End: 2023-05-11

## 2023-05-11 ENCOUNTER — OFFICE VISIT (OUTPATIENT)
Dept: URBAN - METROPOLITAN AREA MEDICAL CENTER 1 | Facility: MEDICAL CENTER | Age: 78
End: 2023-05-11
Payer: MEDICARE

## 2023-05-11 DIAGNOSIS — D50.9 CHRONIC IRON DEFICIENCY ANEMIA: ICD-10-CM

## 2023-05-11 DIAGNOSIS — K22.89 OTHER SPECIFIED DISEASE OF ESOPHAGUS: ICD-10-CM

## 2023-05-11 DIAGNOSIS — K29.30 CHRONIC SUPERFICIAL GASTRITIS: ICD-10-CM

## 2023-05-11 DIAGNOSIS — K29.80 ACUTE DUODENITIS: ICD-10-CM

## 2023-05-11 DIAGNOSIS — K50.90 ABDOMINAL PAIN DESPITE THERAPY FOR CROHN'S DISEASE: ICD-10-CM

## 2023-05-11 PROCEDURE — 43239 EGD BIOPSY SINGLE/MULTIPLE: CPT | Performed by: INTERNAL MEDICINE

## 2023-05-11 PROCEDURE — 45380 COLONOSCOPY AND BIOPSY: CPT | Performed by: INTERNAL MEDICINE

## 2023-05-12 ENCOUNTER — LAB OUTSIDE AN ENCOUNTER (OUTPATIENT)
Dept: URBAN - NONMETROPOLITAN AREA CLINIC 2 | Facility: CLINIC | Age: 78
End: 2023-05-12

## 2023-05-12 ENCOUNTER — TELEPHONE ENCOUNTER (OUTPATIENT)
Dept: URBAN - NONMETROPOLITAN AREA CLINIC 2 | Facility: CLINIC | Age: 78
End: 2023-05-12

## 2023-05-12 LAB
AP CASE REPORT: (no result)
AP FINAL DIAGNOSIS: (no result)
AP GROSS DESCRIPTION: (no result)
AP MICROSCOPIC DESCRIPTION: (no result)
BASOPHILS AUTOMATED ABSOLUTE COUNT: 0
BASOPHILS RELATIVE PERCENT: 0.3
EOSINOPHILS AUTOMATED ABSOLUTE COUNT: 0
EOSINOPHILS RELATIVE PERCENT: 0.6
HEMATOCRIT: 34.2
HEMOGLOBIN: 11.6
LYMPHOCYTES AUTOMATED ABSOLUTE COUNT: 1.1
LYMPHOCYTES RELATIVE PERCENT: 17.4
MEAN CORPUSCULAR HEMOGLOBIN CONC: 33.8
MEAN CORPUSCULAR HEMOGLOBIN: 32.2
MEAN CORPUSCULAR VOLUME: 95.3
MEAN PLATELET VOLUME: 8.2
MONOCYTES AUTOMATED ABSOLUTE COUNT: 0.6
MONOCYTES RELATIVE PERCENT: 8.4
NEUTROPHILS AUTOMATED ABSOLUTE: 4.8
NEUTROPHILS RELATIVE PERCENT: 73.3
PLATELET COUNT: 265
RED BLOOD CELL COUNT: 3.59
RED CELL DISTRIBUTION WIDTH: 12.6
WHITE BLOOD CELL COUNT: 6.6

## 2023-05-15 ENCOUNTER — WEB ENCOUNTER (OUTPATIENT)
Dept: URBAN - NONMETROPOLITAN AREA CLINIC 2 | Facility: CLINIC | Age: 78
End: 2023-05-15

## 2023-05-15 ENCOUNTER — TELEPHONE ENCOUNTER (OUTPATIENT)
Dept: URBAN - NONMETROPOLITAN AREA CLINIC 2 | Facility: CLINIC | Age: 78
End: 2023-05-15

## 2023-10-09 ENCOUNTER — TELEPHONE ENCOUNTER (OUTPATIENT)
Dept: URBAN - NONMETROPOLITAN AREA CLINIC 13 | Facility: CLINIC | Age: 78
End: 2023-10-09

## 2023-10-11 ENCOUNTER — OFFICE VISIT (OUTPATIENT)
Dept: URBAN - NONMETROPOLITAN AREA CLINIC 2 | Facility: CLINIC | Age: 78
End: 2023-10-11

## 2023-10-13 ENCOUNTER — OFFICE VISIT (OUTPATIENT)
Dept: URBAN - NONMETROPOLITAN AREA CLINIC 2 | Facility: CLINIC | Age: 78
End: 2023-10-13
Payer: MEDICARE

## 2023-10-13 VITALS
DIASTOLIC BLOOD PRESSURE: 71 MMHG | WEIGHT: 112.7 LBS | BODY MASS INDEX: 20.74 KG/M2 | HEIGHT: 62 IN | SYSTOLIC BLOOD PRESSURE: 119 MMHG | TEMPERATURE: 98.7 F | HEART RATE: 85 BPM

## 2023-10-13 DIAGNOSIS — K52.9 ILEITIS: ICD-10-CM

## 2023-10-13 DIAGNOSIS — K58.0 IBS-D: ICD-10-CM

## 2023-10-13 DIAGNOSIS — Z12.11 SCREENING FOR COLON CANCER: ICD-10-CM

## 2023-10-13 DIAGNOSIS — K58.9 IBS (IRRITABLE BOWEL SYNDROME): ICD-10-CM

## 2023-10-13 DIAGNOSIS — A04.72 C. DIFFICILE DIARRHEA: ICD-10-CM

## 2023-10-13 DIAGNOSIS — K50.00 CROHN'S DISEASE OF SMALL INTESTINE WITHOUT COMPLICATION: ICD-10-CM

## 2023-10-13 DIAGNOSIS — K62.5 RECTAL BLEEDING: ICD-10-CM

## 2023-10-13 DIAGNOSIS — K27.9 PEPTIC ULCER: ICD-10-CM

## 2023-10-13 DIAGNOSIS — D50.8 ACHLORHYDRIC ANEMIA: ICD-10-CM

## 2023-10-13 DIAGNOSIS — D64.9 ANEMIA: ICD-10-CM

## 2023-10-13 DIAGNOSIS — R93.5 ABNORMAL MRI OF ABDOMEN: ICD-10-CM

## 2023-10-13 DIAGNOSIS — G45.9 TIA (TRANSIENT ISCHEMIC ATTACK): ICD-10-CM

## 2023-10-13 PROBLEM — 266257000: Status: ACTIVE | Noted: 2023-10-13

## 2023-10-13 PROBLEM — 12063002: Status: ACTIVE | Noted: 2021-08-18

## 2023-10-13 PROBLEM — 56689002: Status: ACTIVE | Noted: 2022-03-15

## 2023-10-13 PROBLEM — 52457000: Status: ACTIVE | Noted: 2021-10-10

## 2023-10-13 PROBLEM — 5891000119102: Status: ACTIVE | Noted: 2023-03-21

## 2023-10-13 PROCEDURE — 99214 OFFICE O/P EST MOD 30 MIN: CPT | Performed by: NURSE PRACTITIONER

## 2023-10-13 RX ORDER — BUDESONIDE 3 MG/1
3 CAP PO DAILY CAPSULE, COATED PELLETS ORAL ONCE A DAY
Qty: 90 CAPSULE | Refills: 0 | Status: ON HOLD | COMMUNITY
Start: 2021-10-14

## 2023-10-13 RX ORDER — SODIUM PICOSULFATE, MAGNESIUM OXIDE, AND ANHYDROUS CITRIC ACID 10; 3.5; 12 MG/160ML; G/160ML; G/160ML
160ML X 2 AS DIRECTED LIQUID ORAL ONCE
Qty: 320 MILLILITER | Refills: 0 | Status: ACTIVE | COMMUNITY
Start: 2023-03-21

## 2023-10-13 RX ORDER — LIOTHYRONINE SODIUM 5 UG/1
6 DAYS A WEEK TABLET ORAL
Qty: 0 | Refills: 0 | Status: ACTIVE | COMMUNITY
Start: 1900-01-01

## 2023-10-13 RX ORDER — FAMOTIDINE 20 MG/1
TAKE 1 TABLET BY MOUTH TWICE A DAY TABLET ORAL TWICE A DAY
Qty: 180 TABLET | Refills: 3 | Status: ON HOLD | COMMUNITY
Start: 2021-08-18

## 2023-10-13 NOTE — HPI-TODAY'S VISIT:
12/10/2018 Ms Jarrett Is a 73-year-old female with a past medical history of SIBO he returns for follow-up of GI bleed. She reports she was in [place] in the fall and developed some epigastric discomfort. Previously, she had been treated for with Xifaxan for SIBO and believed it had returned. [place] to the [place], she took supplements for IBS that did not resolve her symptoms. She also was prescribed Alinia by physician in [place] that also did not resolve her symptoms. She went to visit her daughter in [place] and continued to have epigastric pain. She began to have black stool and her daughter noticed she had lost roughly 10 pounds. She then presented to emergency department in [place] with a hemoglobin of 6.1. She then had an EGD and colonoscopy while inpatient there. Normal colonoscopy. EGD with a 2.5 cm duodenal ulcer. Pathology negative for H. pylori. No NSAIDs or Goody powder use. She denies any EtOH. She denies any further bleeding, weight loss, or additional GI symptoms. Otherwise, she is healthy.  11/8/19 Ms. Jarrett presents today for f/u of her US and CT and MRI. She was having RUQ pain at our last visit, this has now resolved. She had an US with an abnormal lymph node. Her CT was initially read as normal, but then when compared with US, an inflammed lesion was again identifed. ON MRI, it appears to be a lymph node, but f/u in 6 months was recommeded. Ms. Jarrett is feeling better today and she is without GI complaints. She wants to try to wean down on her PPI. We repeated her EGD because she had a large DU at an outside facility during a GI bleed. Her EGD did not reveal an ulcer. She weaned her PPI down to 20mg in the PM. She started having RUQ abdominal pain and she was concerned about her ulcer. Her recent HGB had improved.  5/8/20 Ms. Connolly presents today for f/u of her MRI. She has been feeling well. She denies any reflux or RUQ pain. She wants to try to wean down on pepcid. Her MRI did show a stable lesion near her adrenal. She is interested in seeing a urologist. Overall, she is without GI complaints and feeling well.  8/18/2021 Barbie presents for evaluation of dyspepsia, reported dark stools with questionable melena, anemia, and Hemoccult positive stool.  She states in mid July she ate a piece of gluten-free bread and developed immediate dyspepsia, gas, bloating and dark stools.  She happened to have an appointment with her family physician that week and had lab work done with mild anemia.  She requested Hemoccult cards, these did come back trace positive.  She did note dark stools for 3 days after this episode.  She did start taking Pepcid with some provement in her dyspepsia.  She denies any NSAID use since her GI bleed in 2018 in [place] with a history of a DU.  She saw Carissa and Ihsan at the time, she did have Crohn's genetics drawn at that time which were positive.  She did have a colonoscopy at the time as well in [place] that was normal per her report.  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  She was referred to Dr. Fry who did perform a work-up, her last MRI in April 2021 shows stable lesion.  She denies any specific GI complaints prior to eating the piece of gluten-free bread.  She denies any weight loss.  Her last EGD was done in 2019 with no peptic ulcer disease noted, her biopsies did show gastritis and esophagitis.  Her last colonoscopy done by our practice was in 2013 by Dr. Herrera with normal terminal ileum and normal random biopsies.  She also had one in [place] in 2018.  These were results are not available in the chart.  Today she is concerned about her anemia and her recent episode of dark stools and dyspepsia.  She would like to proceed with a EGD and colonoscopy for further evaluation.  MB   10/13/2021 Barbie presents for endoscopy and colonoscopy follow-up.  Since her last visit her lab work reveals no anemia and normal ferritin.  Her EGD reveals normal small bowel, normal stomach, with mild gastritis, and normal esophagus other than mild esophagitis, her colonoscopy reveals multiple aphthous ulcers and friability with erythema in her terminal ileum and at the ileocecal valve.  Her biopsy showed normal small bowel biopsies, normal gastric biopsies, mild reflux esophagitis, she does have terminal ileitis with features concerning for Crohn's disease although she does not have any granulomas.  She does have a history of positive Crohn's genetics as listed above.  She continues to struggle with constipation but has a BM daily on Magnesium and probiotics. She denies any micky bleeding but her stools are darker per her report. Today we had a long discussion regarding the differential.  She has not taken NSAIDs in years, she has had multiple scans of her abdomen with no history of documented mesenteric vascular stenosis, she does have chronic constipation.  She understands that the differential includes ischemia, history of NSAID use, and Crohn's disease.  She agrees to try budesonide and to proceed with video capsule endoscopy for further evaluation.  MB 3/15/2022 The patient presents today for follow-up of her capsule endoscopy and Crohn's disease.  Since her last visit, she has had a complicated course with multiple bouts of C. difficile.  She has been treated with vancomycin multiple times, and her most recent C. difficile result was negative.  She is actually feeling better now than she has in quite some time.  She is not having any further abdominal pain, diarrhea, or rectal bleeding.  She never had any diarrhea with her C. difficile.  This was found incidentally.  Since her last visit, she also had a capsule endoscopy.  Her capsule endoscopy was consistent with Crohn's disease with multiple aphthous ulcers throughout her small bowel.  We have had a long discussion today about Crohn's disease and the implications.  I have recommended that we start her on medications for her Crohn's, and we have discussed Stelara.  She does want to think about this, but she is in agreement that she does need to have treatment.  She continues to have issues with constipation.  She also has a very limited diet.  There are many foods that aggravate her gut.  Overall, today, her GI symptoms are actually well controlled and stable. 3/21/2023 Barbie presents for follow-up of Crohn's disease and history of C. difficile.  Since her last visit she passed a clot of stool in late January.  She called her primary care physician who is also naturopath who ordered a stool PCR panel through [facility] [employer].  The PCR panel showed C. difficile toxin positivity, no PCR was actually drawn.  She had no diarrhea at the time and only had this 1 episode of blood in the stool.  They decided to wait to treat and to repeat her stool studies in 2 weeks.  He repeated the PCR panel along with an additional C. difficile toxin via EIA, and PCR.  The GI stool panel showed positive toxin however the toxin by Kristen is negative along with the PCR being positive.  She has had no diarrhea throughout.  He recommended a vancomycin taper given her history of vancomycin x2 last year.  She has a history of Crohn's disease which she continues to decline remission therapy for.  She has 1-2 soft bowel movements daily, recently they have been somewhat more dark.  She has not had any recent lab work.  Today we have discussed her work-up.  I do not suspect that she has C. difficile, I suspect that the GI stool panel is a false positive given no diarrhea.  She does have a negative fecal calprotectin as well.  We will schedule colonoscopy for further evaluation of her untreated Crohn's disease and rectal bleeding.  MB 10/13/2023 Barbie is a 77-year-old female who presents for follow-up of history of possible Crohn's disease and rectal bleeding.  Her repeat EGD and colonoscopy in May were completely normal with mild gastritis normal terminal ileum normal colon and normal ileal colonic biopsies.  She recently had a TIA after COVID.  She is on Plavix and baby aspirin.  She does have a history of peptic ulcer disease in the distant past.  She is on omeprazole 20 mg daily for PUD prophylaxis.  She had no further GI bleeding.  Her hemoglobin is stable.  Today she is doing fairly well in regard to her GI complaints, she is nervous about the Plavix and PPI use but agrees to continue these.  MB

## 2023-10-20 ENCOUNTER — OFFICE VISIT (OUTPATIENT)
Dept: URBAN - NONMETROPOLITAN AREA CLINIC 13 | Facility: CLINIC | Age: 78
End: 2023-10-20

## 2023-10-20 RX ORDER — BUDESONIDE 3 MG/1
3 CAP PO DAILY CAPSULE, COATED PELLETS ORAL ONCE A DAY
Qty: 90 CAPSULE | Refills: 0 | Status: ON HOLD | COMMUNITY
Start: 2021-10-14

## 2023-10-20 RX ORDER — SODIUM PICOSULFATE, MAGNESIUM OXIDE, AND ANHYDROUS CITRIC ACID 10; 3.5; 12 MG/160ML; G/160ML; G/160ML
160ML X 2 AS DIRECTED LIQUID ORAL ONCE
Qty: 320 MILLILITER | Refills: 0 | Status: ACTIVE | COMMUNITY
Start: 2023-03-21

## 2023-10-20 RX ORDER — LIOTHYRONINE SODIUM 5 UG/1
6 DAYS A WEEK TABLET ORAL
Qty: 0 | Refills: 0 | Status: ACTIVE | COMMUNITY
Start: 1900-01-01

## 2023-10-20 RX ORDER — FAMOTIDINE 20 MG/1
TAKE 1 TABLET BY MOUTH TWICE A DAY TABLET ORAL TWICE A DAY
Qty: 180 TABLET | Refills: 3 | Status: ON HOLD | COMMUNITY
Start: 2021-08-18

## 2024-04-12 ENCOUNTER — LAB (OUTPATIENT)
Dept: URBAN - NONMETROPOLITAN AREA CLINIC 2 | Facility: CLINIC | Age: 79
End: 2024-04-12

## 2024-04-12 ENCOUNTER — OV EP (OUTPATIENT)
Dept: URBAN - NONMETROPOLITAN AREA CLINIC 2 | Facility: CLINIC | Age: 79
End: 2024-04-12
Payer: MEDICARE

## 2024-04-12 VITALS
HEART RATE: 70 BPM | SYSTOLIC BLOOD PRESSURE: 115 MMHG | DIASTOLIC BLOOD PRESSURE: 70 MMHG | BODY MASS INDEX: 20.24 KG/M2 | TEMPERATURE: 98.6 F | WEIGHT: 110 LBS | HEIGHT: 62 IN

## 2024-04-12 DIAGNOSIS — K62.5 RECTAL BLEEDING: ICD-10-CM

## 2024-04-12 DIAGNOSIS — K58.9 IRRITABLE BOWEL SYNDROME WITHOUT DIARRHEA: ICD-10-CM

## 2024-04-12 DIAGNOSIS — K27.9 PEPTIC ULCER: ICD-10-CM

## 2024-04-12 DIAGNOSIS — R10.11 RUQ ABDOMINAL PAIN: ICD-10-CM

## 2024-04-12 PROBLEM — 301717006: Status: ACTIVE | Noted: 2024-04-12

## 2024-04-12 PROCEDURE — 99214 OFFICE O/P EST MOD 30 MIN: CPT | Performed by: NURSE PRACTITIONER

## 2024-04-12 RX ORDER — LIOTHYRONINE SODIUM 5 UG/1
6 DAYS A WEEK TABLET ORAL
Qty: 0 | Refills: 0 | Status: ACTIVE | COMMUNITY
Start: 1900-01-01

## 2024-04-12 RX ORDER — PANTOPRAZOLE SODIUM 20 MG/1
1 TABLET TABLET, DELAYED RELEASE ORAL ONCE A DAY
Qty: 90 TABLET | Refills: 3 | OUTPATIENT
Start: 2024-04-12

## 2024-04-12 RX ORDER — SODIUM PICOSULFATE, MAGNESIUM OXIDE, AND ANHYDROUS CITRIC ACID 10; 3.5; 12 MG/160ML; G/160ML; G/160ML
160ML X 2 AS DIRECTED LIQUID ORAL ONCE
Qty: 320 MILLILITER | Refills: 0 | Status: ACTIVE | COMMUNITY
Start: 2023-03-21

## 2024-04-12 RX ORDER — BUDESONIDE 3 MG/1
3 CAP PO DAILY CAPSULE, COATED PELLETS ORAL ONCE A DAY
Qty: 90 CAPSULE | Refills: 0 | Status: ON HOLD | COMMUNITY
Start: 2021-10-14

## 2024-04-12 RX ORDER — FAMOTIDINE 20 MG/1
TAKE 1 TABLET BY MOUTH TWICE A DAY TABLET ORAL TWICE A DAY
Qty: 180 TABLET | Refills: 3 | Status: ON HOLD | COMMUNITY
Start: 2021-08-18

## 2024-04-12 NOTE — HPI-TODAY'S VISIT:
12/10/2018 Ms Jarrett Is a 73-year-old female with a past medical history of SIBO he returns for follow-up of GI bleed. She reports she was in [place] in the fall and developed some epigastric discomfort. Previously, she had been treated for with Xifaxan for SIBO and believed it had returned. [place], she took supplements for IBS that did not resolve her symptoms. She also was prescribed Alinia by physician in [place] that also did not resolve her symptoms. She went to visit her daughter in [place] and continued to have epigastric pain. She began to have black stool and her daughter noticed she had lost roughly 10 pounds. She then presented to emergency department in [place] with a hemoglobin of 6.1. She then had an EGD and colonoscopy while inpatient there. Normal colonoscopy. EGD with a 2.5 cm duodenal ulcer. Pathology negative for H. pylori. No NSAIDs or Goody powder use. She denies any EtOH. She denies any further bleeding, weight loss, or additional GI symptoms. Otherwise, she is healthy.  11/8/19 Ms. Jarrett presents today for f/u of her US and CT and MRI. She was having RUQ pain at our last visit, this has now resolved. She had an US with an abnormal lymph node. Her CT was initially read as normal, but then when compared with US, an inflammed lesion was again identifed. ON MRI, it appears to be a lymph node, but f/u in 6 months was recommeded. Ms. Jarrett is feeling better today and she is without GI complaints. She wants to try to wean down on her PPI. We repeated her EGD because she had a large DU at an outside facility during a GI bleed. Her EGD did not reveal an ulcer. She weaned her PPI down to 20mg in the PM. She started having RUQ abdominal pain and she was concerned about her ulcer. Her recent HGB had improved.  5/8/20 Ms. Connolly presents today for f/u of her MRI. She has been feeling well. She denies any reflux or RUQ pain. She wants to try to wean down on pepcid. Her MRI did show a stable lesion near her adrenal. She is interested in seeing a urologist. Overall, she is without GI complaints and feeling well.  8/18/2021 Barbie presents for evaluation of dyspepsia, reported dark stools with questionable melena, anemia, and Hemoccult positive stool.  She states in mid July she ate a piece of gluten-free bread and developed immediate dyspepsia, gas, bloating and dark stools.  She happened to have an appointment with her family physician that week and had lab work done with mild anemia.  She requested Hemoccult cards, these did come back trace positive.  She did note dark stools for 3 days after this episode.  She did start taking Pepcid with some provement in her dyspepsia.  She denies any NSAID use since her GI bleed in 2018 in [place] with a history of a DU.  She saw Carissa and Ihsan at the time, she did have Crohn's genetics drawn at that time which were positive.  She did have a colonoscopy at the time as well in [place] that was normal per her report.  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  She was referred to Dr. Fry who did perform a work-up, her last MRI in April 2021 shows stable lesion.  She denies any specific GI complaints prior to eating the piece of gluten-free bread.  She denies any weight loss.  Her last EGD was done in 2019 with no peptic ulcer disease noted, her biopsies did show gastritis and esophagitis.  Her last colonoscopy done by our practice was in 2013 by Dr. Herrera with normal terminal ileum and normal random biopsies.  She also had one in [place] in 2018.  These were results are not available in the chart.  Today she is concerned about her anemia and her recent episode of dark stools and dyspepsia.  She would like to proceed with a EGD and colonoscopy for further evaluation.  MB   10/13/2021 Barbie presents for endoscopy and colonoscopy follow-up.  Since her last visit her lab work reveals no anemia and normal ferritin.  Her EGD reveals normal small bowel, normal stomach, with mild gastritis, and normal esophagus other than mild esophagitis, her colonoscopy reveals multiple aphthous ulcers and friability with erythema in her terminal ileum and at the ileocecal valve.  Her biopsy showed normal small bowel biopsies, normal gastric biopsies, mild reflux esophagitis, she does have terminal ileitis with features concerning for Crohn's disease although she does not have any granulomas.  She does have a history of positive Crohn's genetics as listed above.  She continues to struggle with constipation but has a BM daily on Magnesium and probiotics. She denies any micky bleeding but her stools are darker per her report. Today we had a long discussion regarding the differential.  She has not taken NSAIDs in years, she has had multiple scans of her abdomen with no history of documented mesenteric vascular stenosis, she does have chronic constipation.  She understands that the differential includes ischemia, history of NSAID use, and Crohn's disease.  She agrees to try budesonide and to proceed with video capsule endoscopy for further evaluation.  MB 3/15/2022 The patient presents today for follow-up of her capsule endoscopy and Crohn's disease.  Since her last visit, she has had a complicated course with multiple bouts of C. difficile.  She has been treated with vancomycin multiple times, and her most recent C. difficile result was negative.  She is actually feeling better now than she has in quite some time.  She is not having any further abdominal pain, diarrhea, or rectal bleeding.  She never had any diarrhea with her C. difficile.  This was found incidentally.  Since her last visit, she also had a capsule endoscopy.  Her capsule endoscopy was consistent with Crohn's disease with multiple aphthous ulcers throughout her small bowel.  We have had a long discussion today about Crohn's disease and the implications.  I have recommended that we start her on medications for her Crohn's, and we have discussed Stelara.  She does want to think about this, but she is in agreement that she does need to have treatment.  She continues to have issues with constipation.  She also has a very limited diet.  There are many foods that aggravate her gut.  Overall, today, her GI symptoms are actually well controlled and stable. 3/21/2023 Barbie presents for follow-up of Crohn's disease and history of C. difficile.  Since her last visit she passed a clot of stool in late January.  She called her primary care physician who is also naturopath who ordered a stool PCR panel through [facility].  The PCR panel showed C. difficile toxin positivity, no PCR was actually drawn.  She had no diarrhea at the time and only had this 1 episode of blood in the stool.  They decided to wait to treat and to repeat her stool studies in 2 weeks.  He repeated the PCR panel along with an additional C. difficile toxin via EIA, and PCR.  The GI stool panel showed positive toxin however the toxin by Kristen is negative along with the PCR being positive.  She has had no diarrhea throughout.  He recommended a vancomycin taper given her history of vancomycin x2 last year.  She has a history of Crohn's disease which she continues to decline remission therapy for.  She has 1-2 soft bowel movements daily, recently they have been somewhat more dark.  She has not had any recent lab work.  Today we have discussed her work-up.  I do not suspect that she has C. difficile, I suspect that the GI stool panel is a false positive given no diarrhea.  She does have a negative fecal calprotectin as well.  We will schedule colonoscopy for further evaluation of her untreated Crohn's disease and rectal bleeding.  MB 10/13/2023 Barbie is a 77-year-old female who presents for follow-up of history of possible Crohn's disease and rectal bleeding.  Her repeat EGD and colonoscopy in May were completely normal with mild gastritis normal terminal ileum normal colon and normal ileal colonic biopsies.  She recently had a TIA after COVID.  She is on Plavix and baby aspirin.  She does have a history of peptic ulcer disease in the distant past.  She is on omeprazole 20 mg daily for PUD prophylaxis.  She had no further GI bleeding.  Her hemoglobin is stable.  Today she is doing fairly well in regard to her GI complaints, she is nervous about the Plavix and PPI use but agrees to continue these.  MB 4/12/24 Barbie presents for follow-up.  Since her last visit she developed acute right upper quadrant abdominal pain.  She is on Pepcid twice daily no longer on PPI.  She is on a baby aspirin and off Plavix.  Her EGD in 2023 shows no recurrent peptic ulcer disease.  The pain was sharp and stabbing in the evening twice over the past 2 weeks.  This may have been postprandial.  She has a history of biliary dyskinesia noted on HIDA scan in the distant past with an ejection fraction of 27%.  However she did not pursue cholecystectomy.  Today she is very concerned about her gallbladder.  She agrees to labs and stool studies including H. pylori.  We will change her Pepcid to pantoprazole 20 mg daily.  Consider repeat HIDA scan if she has an acute episode of pain.  MB

## 2024-04-13 LAB
A/G RATIO: 1.8
ABSOLUTE BASOPHILS: 38
ABSOLUTE EOSINOPHILS: 108
ABSOLUTE LYMPHOCYTES: 846
ABSOLUTE MONOCYTES: 390
ABSOLUTE NEUTROPHILS: 3318
ALBUMIN: 4.3
ALKALINE PHOSPHATASE: 78
ALT (SGPT): 20
AST (SGOT): 29
BASOPHILS: 0.8
BILIRUBIN, TOTAL: 0.5
BUN/CREATININE RATIO: (no result)
BUN: 20
CALCIUM: 9.9
CARBON DIOXIDE, TOTAL: 31
CHLORIDE: 100
CREATININE: 0.79
EGFR: 77
EOSINOPHILS: 2.3
GLOBULIN, TOTAL: 2.4
GLUCOSE: 105
HEMATOCRIT: 38.4
HEMOGLOBIN: 12.7
LYMPHOCYTES: 18
MCH: 31.4
MCHC: 33.1
MCV: 95
MONOCYTES: 8.3
MPV: 10.3
NEUTROPHILS: 70.6
PLATELET COUNT: 278
POTASSIUM: 4.5
PROTEIN, TOTAL: 6.7
RDW: 12.1
RED BLOOD CELL COUNT: 4.04
SODIUM: 140
WHITE BLOOD CELL COUNT: 4.7

## 2024-04-20 LAB
CALPROTECTIN, FECAL: 7
RESULT:: (no result)

## 2024-07-25 ENCOUNTER — WEB ENCOUNTER (OUTPATIENT)
Dept: URBAN - NONMETROPOLITAN AREA CLINIC 2 | Facility: CLINIC | Age: 79
End: 2024-07-25

## 2024-08-01 ENCOUNTER — LAB OUTSIDE AN ENCOUNTER (OUTPATIENT)
Dept: URBAN - NONMETROPOLITAN AREA CLINIC 2 | Facility: CLINIC | Age: 79
End: 2024-08-01

## 2024-08-01 ENCOUNTER — WEB ENCOUNTER (OUTPATIENT)
Dept: URBAN - NONMETROPOLITAN AREA CLINIC 2 | Facility: CLINIC | Age: 79
End: 2024-08-01

## 2024-08-01 ENCOUNTER — DASHBOARD ENCOUNTERS (OUTPATIENT)
Age: 79
End: 2024-08-01

## 2024-08-01 ENCOUNTER — OFFICE VISIT (OUTPATIENT)
Dept: URBAN - NONMETROPOLITAN AREA CLINIC 2 | Facility: CLINIC | Age: 79
End: 2024-08-01
Payer: MEDICARE

## 2024-08-01 VITALS
HEIGHT: 62 IN | HEART RATE: 92 BPM | WEIGHT: 112 LBS | TEMPERATURE: 98 F | DIASTOLIC BLOOD PRESSURE: 72 MMHG | SYSTOLIC BLOOD PRESSURE: 131 MMHG | BODY MASS INDEX: 20.61 KG/M2

## 2024-08-01 DIAGNOSIS — D64.89 ANEMIA DUE TO OTHER CAUSE: ICD-10-CM

## 2024-08-01 DIAGNOSIS — K27.9 PEPTIC ULCER: ICD-10-CM

## 2024-08-01 DIAGNOSIS — K50.00 CROHN'S DISEASE OF SMALL INTESTINE WITHOUT COMPLICATION: ICD-10-CM

## 2024-08-01 PROCEDURE — 99214 OFFICE O/P EST MOD 30 MIN: CPT | Performed by: NURSE PRACTITIONER

## 2024-08-01 RX ORDER — ATORVASTATIN CALCIUM 20 MG/1
1 TABLET TABLET, FILM COATED ORAL ONCE A DAY
Status: ACTIVE | COMMUNITY

## 2024-08-01 RX ORDER — PANTOPRAZOLE SODIUM 20 MG/1
1 TABLET TABLET, DELAYED RELEASE ORAL ONCE A DAY
Qty: 90 TABLET | Refills: 3 | Status: ACTIVE | COMMUNITY
Start: 2024-04-12

## 2024-08-01 RX ORDER — LIOTHYRONINE SODIUM 5 UG/1
6 DAYS A WEEK TABLET ORAL
Qty: 0 | Refills: 0 | Status: ACTIVE | COMMUNITY
Start: 1900-01-01

## 2024-08-01 NOTE — HPI-TODAY'S VISIT:
12/10/2018 Ms Jarrett Is a 73-year-old female with a past medical history of SIBO he returns for follow-up of GI bleed. She reports she was in [place] in the fall and developed some epigastric discomfort. Previously, she had been treated for with Xifaxan for SIBO and believed it had returned. When [place] returned to the [place], she took supplements for IBS that did not resolve her symptoms. She also was prescribed Alinia by physician in [place] that also did not resolve her symptoms. She went to visit her daughter in [place] and continued to have epigastric pain. She began to have black stool and her daughter noticed she had lost roughly 10 pounds. She then presented to emergency department in [place] with a hemoglobin of 6.1. She then had an EGD and colonoscopy while inpatient there. Normal colonoscopy. EGD with a 2.5 cm duodenal ulcer. Pathology negative for H. pylori. No NSAIDs or Goody powder use. She denies any EtOH. She denies any further bleeding, weight loss, or additional GI symptoms. Otherwise, she is healthy.  11/8/19 Ms. Jarrett presents today for f/u of her US and CT and MRI. She was having RUQ pain at our last visit, this has now resolved. She had an US with an abnormal lymph node. Her CT was initially read as normal, but then when compared with US, an inflammed lesion was again identifed. ON MRI, it appears to be a lymph node, but f/u in 6 months was recommeded. Ms. Jarrett is feeling better today and she is without GI complaints. She wants to try to wean down on her PPI. We repeated her EGD because she had a large DU at an outside facility during a GI bleed. Her EGD did not reveal an ulcer. She weaned her PPI down to 20mg in the PM. She started having RUQ abdominal pain and she was concerned about her ulcer. Her recent HGB had improved.  5/8/20 Ms. Connolly presents today for f/u of her MRI. She has been feeling well. She denies any reflux or RUQ pain. She wants to try to wean down on pepcid. Her MRI did show a stable lesion near her adrenal. She is interested in seeing a urologist. Overall, she is without GI complaints and feeling well.  8/18/2021 Barbie presents for evaluation of dyspepsia, reported dark stools with questionable melena, anemia, and Hemoccult positive stool.  She states in mid July she ate a piece of gluten-free bread and developed immediate dyspepsia, gas, bloating and dark stools.  She happened to have an appointment with her family physician that week and had lab work done with mild anemia.  She requested Hemoccult cards, these did come back trace positive.  She did note dark stools for 3 days after this episode.  She did start taking Pepcid with some provement in her dyspepsia.  She denies any NSAID use since her GI bleed in 2018 in [place] with a history of a DU.  She saw Carissa and Ihsan at the time, she did have Crohn's genetics drawn at that time which were positive.  She did have a colonoscopy at the time as well in [place] that was normal per her report.  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  She was referred to Dr. Fry who did perform a work-up, her last MRI in April 2021 shows stable lesion.  She denies any specific GI complaints prior to eating the piece of gluten-free bread.  She denies any weight loss.  Her last EGD was done in 2019 with no peptic ulcer disease noted, her biopsies did show gastritis and esophagitis.  Her last colonoscopy done by our practice was in 2013 by Dr. Herrera with normal terminal ileum and normal random biopsies.  She also had one in [place] in 2018.  These were results are not available in the chart.  Today she is concerned about her anemia and her recent episode of dark stools and dyspepsia.  She would like to proceed with a EGD and colonoscopy for further evaluation.  MB   10/13/2021 Barbie presents for endoscopy and colonoscopy follow-up.  Since her last visit her lab work reveals no anemia and normal ferritin.  Her EGD reveals normal small bowel, normal stomach, with mild gastritis, and normal esophagus other than mild esophagitis, her colonoscopy reveals multiple aphthous ulcers and friability with erythema in her terminal ileum and at the ileocecal valve.  Her biopsy showed normal small bowel biopsies, normal gastric biopsies, mild reflux esophagitis, she does have terminal ileitis with features concerning for Crohn's disease although she does not have any granulomas.  She does have a history of positive Crohn's genetics as listed above.  She continues to struggle with constipation but has a BM daily on Magnesium and probiotics. She denies any micky bleeding but her stools are darker per her report. Today we had a long discussion regarding the differential.  She has not taken NSAIDs in years, she has had multiple scans of her abdomen with no history of documented mesenteric vascular stenosis, she does have chronic constipation.  She understands that the differential includes ischemia, history of NSAID use, and Crohn's disease.  She agrees to try budesonide and to proceed with video capsule endoscopy for further evaluation.  MB 3/15/2022 The patient presents today for follow-up of her capsule endoscopy and Crohn's disease.  Since her last visit, she has had a complicated course with multiple bouts of C. difficile.  She has been treated with vancomycin multiple times, and her most recent C. difficile result was negative.  She is actually feeling better now than she has in quite some time.  She is not having any further abdominal pain, diarrhea, or rectal bleeding.  She never had any diarrhea with her C. difficile.  This was found incidentally.  Since her last visit, she also had a capsule endoscopy.  Her capsule endoscopy was consistent with Crohn's disease with multiple aphthous ulcers throughout her small bowel.  We have had a long discussion today about Crohn's disease and the implications.  I have recommended that we start her on medications for her Crohn's, and we have discussed Stelara.  She does want to think about this, but she is in agreement that she does need to have treatment.  She continues to have issues with constipation.  She also has a very limited diet.  There are many foods that aggravate her gut.  Overall, today, her GI symptoms are actually well controlled and stable. 3/21/2023 Barbie presents for follow-up of Crohn's disease and history of C. difficile.  Since her last visit she passed a clot of stool in late January.  She called her primary care physician who is also naturopath who ordered a stool PCR panel through [facility].  The PCR panel showed C. difficile toxin positivity, no PCR was actually drawn.  She had no diarrhea at the time and only had this 1 episode of blood in the stool.  They decided to wait to treat and to repeat her stool studies in 2 weeks.  He repeated the PCR panel along with an additional C. difficile toxin via EIA, and PCR.  The GI stool panel showed positive toxin however the toxin by Kristen is negative along with the PCR being positive.  She has had no diarrhea throughout.  He recommended a vancomycin taper given her history of vancomycin x2 last year.  She has a history of Crohn's disease which she continues to decline remission therapy for.  She has 1-2 soft bowel movements daily, recently they have been somewhat more dark.  She has not had any recent lab work.  Today we have discussed her work-up.  I do not suspect that she has C. difficile, I suspect that the GI stool panel is a false positive given no diarrhea.  She does have a negative fecal calprotectin as well.  We will schedule colonoscopy for further evaluation of her untreated Crohn's disease and rectal bleeding.  MB 10/13/2023 Barbie is a 77-year-old female who presents for follow-up of history of possible Crohn's disease and rectal bleeding.  Her repeat EGD and colonoscopy in May were completely normal with mild gastritis normal terminal ileum normal colon and normal ileal colonic biopsies.  She recently had a TIA after COVID.  She is on Plavix and baby aspirin.  She does have a history of peptic ulcer disease in the distant past.  She is on omeprazole 20 mg daily for PUD prophylaxis.  She had no further GI bleeding.  Her hemoglobin is stable.  Today she is doing fairly well in regard to her GI complaints, she is nervous about the Plavix and PPI use but agrees to continue these.  MB 4/12/24 Barbie presents for follow-up.  Since her last visit she developed acute right upper quadrant abdominal pain.  She is on Pepcid twice daily no longer on PPI.  She is on a baby aspirin and off Plavix.  Her EGD in 2023 shows no recurrent peptic ulcer disease.  The pain was sharp and stabbing in the evening twice over the past 2 weeks.  This may have been postprandial.  She has a history of biliary dyskinesia noted on HIDA scan in the distant past with an ejection fraction of 27%.  However she did not pursue cholecystectomy.  Today she is very concerned about her gallbladder.  She agrees to labs and stool studies including H. pylori.  We will change her Pepcid to pantoprazole 20 mg daily.  Consider repeat HIDA scan if she has an acute episode of pain.  MB 8/1/2024  Barbie presents for follow up. 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 She was started on Plavix and aspirin again but discontinued the Plavix after 18 days with bleeding. She has been off aspirin as well. She did notice bright red blood rectum while on Plavix. This dissipated once discontinuing therapy. Her last EGD and colonoscopy May 2023 showed active inflammatory disease or ulcer disease. Her colonoscopy in 2021 shows active ileitis and biopsies positive for crohn's disease. She does have a history of bleeding ulcers as well. She did restart prilosec 20mg daily. I have encouraged her to restart at least baby aspirin as we have discussed that the benefit outweighs the risk in regard to aspirin therapy with TIA and G.I. bleeding. Her hemoglobin has normalized after IV iron with Dr. Persaud. Today she is anxious about the anemia and G.I. bleeding. We have discussed , her clinical course. She does agreed to consider remission therapy if she does have active parentheses. Today she has multiple questions, she would like to pursue EGD and colonoscopy. If she has active ulcer disease, I would recommend high PPI therapy, if she has active parentheses she would likely benefit from Viry Salas. She did not pursue stelara as recommended in the past. MB

## 2024-08-06 ENCOUNTER — TELEPHONE ENCOUNTER (OUTPATIENT)
Dept: URBAN - NONMETROPOLITAN AREA CLINIC 2 | Facility: CLINIC | Age: 79
End: 2024-08-06

## 2024-08-06 RX ORDER — OMEPRAZOLE 20 MG/1
1 CAPSULE 30 MINUTES BEFORE MORNING MEAL CAPSULE, DELAYED RELEASE ORAL ONCE A DAY
Qty: 90 CAPSULES | Refills: 3 | OUTPATIENT
Start: 2024-08-06

## 2024-08-20 ENCOUNTER — OFFICE VISIT (OUTPATIENT)
Dept: URBAN - NONMETROPOLITAN AREA CLINIC 2 | Facility: CLINIC | Age: 79
End: 2024-08-20

## 2024-08-27 ENCOUNTER — OFFICE VISIT (OUTPATIENT)
Dept: URBAN - NONMETROPOLITAN AREA CLINIC 2 | Facility: CLINIC | Age: 79
End: 2024-08-27

## 2024-10-24 ENCOUNTER — OFFICE VISIT (OUTPATIENT)
Dept: URBAN - NONMETROPOLITAN AREA CLINIC 2 | Facility: CLINIC | Age: 79
End: 2024-10-24

## 2025-01-30 ENCOUNTER — OFFICE VISIT (OUTPATIENT)
Dept: URBAN - METROPOLITAN AREA MEDICAL CENTER 1 | Facility: MEDICAL CENTER | Age: 80
End: 2025-01-30

## 2025-04-22 ENCOUNTER — OFFICE VISIT (OUTPATIENT)
Dept: URBAN - NONMETROPOLITAN AREA CLINIC 2 | Facility: CLINIC | Age: 80
End: 2025-04-22